# Patient Record
Sex: MALE | Race: WHITE | Employment: FULL TIME | URBAN - METROPOLITAN AREA
[De-identification: names, ages, dates, MRNs, and addresses within clinical notes are randomized per-mention and may not be internally consistent; named-entity substitution may affect disease eponyms.]

---

## 2019-02-14 ENCOUNTER — HOSPITAL ENCOUNTER (EMERGENCY)
Facility: CLINIC | Age: 49
Discharge: HOME | End: 2019-02-14
Attending: EMERGENCY MEDICINE | Admitting: EMERGENCY MEDICINE
Payer: COMMERCIAL

## 2019-02-14 ENCOUNTER — TRANSFERRED RECORDS (OUTPATIENT)
Dept: HEALTH INFORMATION MANAGEMENT | Facility: CLINIC | Age: 49
End: 2019-02-14

## 2019-02-14 ENCOUNTER — APPOINTMENT (OUTPATIENT)
Dept: CT IMAGING | Facility: CLINIC | Age: 49
End: 2019-02-14
Attending: EMERGENCY MEDICINE
Payer: COMMERCIAL

## 2019-02-14 VITALS
SYSTOLIC BLOOD PRESSURE: 128 MMHG | WEIGHT: 190 LBS | HEART RATE: 61 BPM | OXYGEN SATURATION: 94 % | DIASTOLIC BLOOD PRESSURE: 88 MMHG | RESPIRATION RATE: 9 BRPM

## 2019-02-14 DIAGNOSIS — R07.89 ATYPICAL CHEST PAIN: ICD-10-CM

## 2019-02-14 LAB
ANION GAP SERPL CALCULATED.3IONS-SCNC: 6 MMOL/L (ref 3–14)
BASOPHILS # BLD AUTO: 0 10E9/L (ref 0–0.2)
BASOPHILS NFR BLD AUTO: 0.2 %
BUN SERPL-MCNC: 15 MG/DL (ref 7–30)
CALCIUM SERPL-MCNC: 8.9 MG/DL (ref 8.5–10.1)
CHLORIDE SERPL-SCNC: 105 MMOL/L (ref 94–109)
CO2 SERPL-SCNC: 29 MMOL/L (ref 20–32)
CREAT SERPL-MCNC: 1.28 MG/DL (ref 0.66–1.25)
DIFFERENTIAL METHOD BLD: NORMAL
EOSINOPHIL # BLD AUTO: 0.2 10E9/L (ref 0–0.7)
EOSINOPHIL NFR BLD AUTO: 4.5 %
ERYTHROCYTE [DISTWIDTH] IN BLOOD BY AUTOMATED COUNT: 12 % (ref 10–15)
GFR SERPL CREATININE-BSD FRML MDRD: 65 ML/MIN/{1.73_M2}
GLUCOSE SERPL-MCNC: 86 MG/DL (ref 70–99)
HCT VFR BLD AUTO: 47.1 % (ref 40–53)
HGB BLD-MCNC: 16.4 G/DL (ref 13.3–17.7)
IMM GRANULOCYTES # BLD: 0 10E9/L (ref 0–0.4)
IMM GRANULOCYTES NFR BLD: 0.5 %
LYMPHOCYTES # BLD AUTO: 1.6 10E9/L (ref 0.8–5.3)
LYMPHOCYTES NFR BLD AUTO: 35.4 %
MCH RBC QN AUTO: 31 PG (ref 26.5–33)
MCHC RBC AUTO-ENTMCNC: 34.8 G/DL (ref 31.5–36.5)
MCV RBC AUTO: 89 FL (ref 78–100)
MONOCYTES # BLD AUTO: 0.4 10E9/L (ref 0–1.3)
MONOCYTES NFR BLD AUTO: 9.5 %
NEUTROPHILS # BLD AUTO: 2.2 10E9/L (ref 1.6–8.3)
NEUTROPHILS NFR BLD AUTO: 49.9 %
NRBC # BLD AUTO: 0 10*3/UL
NRBC BLD AUTO-RTO: 0 /100
PLATELET # BLD AUTO: 215 10E9/L (ref 150–450)
POTASSIUM SERPL-SCNC: 3.8 MMOL/L (ref 3.4–5.3)
RBC # BLD AUTO: 5.29 10E12/L (ref 4.4–5.9)
SODIUM SERPL-SCNC: 140 MMOL/L (ref 133–144)
TROPONIN I BLD-MCNC: 0 UG/L (ref 0–0.08)
TROPONIN I SERPL-MCNC: <0.015 UG/L (ref 0–0.04)
WBC # BLD AUTO: 4.4 10E9/L (ref 4–11)

## 2019-02-14 PROCEDURE — 93005 ELECTROCARDIOGRAM TRACING: CPT

## 2019-02-14 PROCEDURE — 25000128 H RX IP 250 OP 636: Performed by: EMERGENCY MEDICINE

## 2019-02-14 PROCEDURE — 84484 ASSAY OF TROPONIN QUANT: CPT | Performed by: EMERGENCY MEDICINE

## 2019-02-14 PROCEDURE — 99285 EMERGENCY DEPT VISIT HI MDM: CPT | Mod: 25

## 2019-02-14 PROCEDURE — 85025 COMPLETE CBC W/AUTO DIFF WBC: CPT | Performed by: EMERGENCY MEDICINE

## 2019-02-14 PROCEDURE — 80048 BASIC METABOLIC PNL TOTAL CA: CPT | Performed by: EMERGENCY MEDICINE

## 2019-02-14 PROCEDURE — 71260 CT THORAX DX C+: CPT

## 2019-02-14 PROCEDURE — 84484 ASSAY OF TROPONIN QUANT: CPT

## 2019-02-14 PROCEDURE — 74177 CT ABD & PELVIS W/CONTRAST: CPT

## 2019-02-14 RX ORDER — OMEGA-3 FATTY ACIDS/FISH OIL 300-1000MG
200 CAPSULE ORAL EVERY 4 HOURS PRN
COMMUNITY

## 2019-02-14 RX ORDER — ASPIRIN 81 MG/1
324 TABLET, CHEWABLE ORAL ONCE
Status: DISCONTINUED | OUTPATIENT
Start: 2019-02-14 | End: 2019-02-14

## 2019-02-14 RX ORDER — IOPAMIDOL 755 MG/ML
500 INJECTION, SOLUTION INTRAVASCULAR ONCE
Status: COMPLETED | OUTPATIENT
Start: 2019-02-14 | End: 2019-02-14

## 2019-02-14 RX ADMIN — SODIUM CHLORIDE 60 ML: 9 INJECTION, SOLUTION INTRAVENOUS at 16:48

## 2019-02-14 RX ADMIN — IOPAMIDOL 75 ML: 755 INJECTION, SOLUTION INTRAVENOUS at 16:48

## 2019-02-14 NOTE — ED TRIAGE NOTES
A&O x4, ABCDs intact. Pt complaint of midsternal chest pressure, back pain, and some numbness in the left shoulder and arm. Pt states he is also SOB. Pt is also having sporatic facial flushing.

## 2019-02-14 NOTE — ED PROVIDER NOTES
History     Chief Complaint:  Chest Pain and Numbness      HPI   Román Lee is a 48 year old male who presents with chest pain and numbness. The patient states that he has had intermittent episodes of non-exertional left sided chest pressure, back pain, and left shoulder numbness for the last 2.5 weeks. The patient states that he has had approximately 6 episodes and notes that they last up to several hours at a time. During the episodes, he also notes feeling generally weak with facial flushing. He states that he went to the chiropractor who suggested he be seen by a physician. The patient was then seen in clinic today and sent here to the ED for further evaluation.  Currently he reports his chest discomfort as a 2/10. The patient denies any leg pain or swelling.  Please refer to relevant cardiac / VTE risk factors as outlined below.  Pt notes his pain is not associated with exertion.  He ran 4 miles yesterday, and had no symptoms.  He acknowledges that his pain is sometimes associated with positions he is sitting in, such as on the cough, and questions if this pain could be coming from his spine.    Cardiac Risk Factors   Sex: Male   Tobacco: Negative  Hypertension: Negative  Diabetes: Negative  Hyperlipidemia: Negative  Family History: Negative    PE/DVT Risk Factors   Personal History: Negative  Recent Travel: Negative   Recent Surgery/Hospitalization: Negative  Tobacco: Negative  Family History: Negative    Allergies:  No known drug allergies.     Medications:    The patient is currently on no regular medications.      Past Medical History:    History reviewed.  No significant past medical history.      Past Surgical History:    History reviewed. No pertinent past surgical history.     Family History:    History reviewed. No pertinent family history.     Social History:  Marital Status: Single  Presents to the ED alone  Tobacco Use: Negative  Alcohol Use: Yes     Review of Systems   Constitutional:         Positive for generalized weakness.    Cardiovascular: Positive for chest pain.   All other systems reviewed and are negative.      Physical Exam   First Vitals:  BP: (!) 146/112  Pulse: 68  Heart Rate: 68  Resp: 18  Weight: 86.2 kg (190 lb)  SpO2: 100 %      Physical Exam  General:              Well-nourished              Speaking in full sentences  Eyes:              Conjunctiva without injection or scleral icterus  ENT:              Moist mucous membranes              Nares patent              Pinnae normal  Neck:              Full ROM              No stiffness appreciated  Resp:              Lungs CTAB              No crackles, wheezing or audible rubs              Good air movement  CV:                    Normal rate, regular rhythm              S1 and S2 present              No murmur, gallop or rub              No anterior chest wall tenderness              2+ radial pulse  GI:              BS present              Abdomen soft without distention              Non-tender to light and deep palpation              No guarding or rebound tenderness  Skin:              Warm, dry, well perfused              No rashes or open wounds on exposed skin  MSK:              Moves all extremities              No focal deformities or swelling              No left trapezius tenderness  Neuro:              Alert              Answers questions appropriately              Moves all extremities equally              Gait stable  Psych:              Normal affect, normal mood     PERC Rule for risk stratifying PE to low risk (calculator)  Background  Risk stratifies patients to low risk of PE if all 8 criteria are present including age <50, heart rate <100, O2 Sat >94%, no unilateral leg edema, no hemoptysis, no recent surgery or trauma, no prior VTE, and no hormone use.  Data  48 year old  Pulse: 68  SpO2: 100 %  Criteria              Of  8 possible items (all criteria must be present):  Age <50 years  Heart rate <100 bpm  Oxygen  Saturation >94%  No unilateral leg swelling  No hemoptysis  No surgery or trauma within 4 weeks  No prior DVT or PE  No hormone use (oral, transdermal and intravaginal estrogens)  Interpretation  All eight criteria are met AND low clinical PE suspicion: No further evaluation for PE required      HEART Score  Background  Calculates the overall risk of adverse event in patient's presenting with chest pain.  Based on 5 criteria (each assigned 0-2 points) including suspiciousness of history, EKG, age, risk factors and troponin.    Data  48 year old male  does not have a problem list on file.   reports that  has never smoked. He uses smokeless tobacco.  family history is not on file.  Lab Results   Component Value Date    TROPI <0.015 02/14/2019     Criteria   0-2 points for each of 5 items (maximum of 10 points):  Score 0- History slightly suspicious for coronary syndrome  Score 0- EKG Normal  Score 1- Age 45 to 65 years old  Score 0- No risk factors for atherosclerotic disease  Score 0- Within normal limits for troponin levels  Interpretation  Risk of adverse outcome  Heart Score: 1  Total Score 0-3- Adverse Outcome Risk 2.5% - Supports early discharge with appropriate follow-up          Emergency Department Course   ECG:  @ 1558  Indication: Chest Pain  Vent. Rate 67 bpm. LA interval 156 ms. QRS duration 88 ms. QT/QTc 398/420 ms. P-R-T axis 61 -6 20.   Normal sinus rhythm. Normal ECG.    Read @ 1604 by Dr. Rossi.     Imaging:  CT Aortic survey with contrast:   1. No evidence of aortic dissection.  2. Fatty liver.  Preliminary radiology read.     Radiographic findings were communicated with the patient who voiced understanding of the findings.    Laboratory:  (1606) Troponin I: <0.015  BMP: Creatinine 1.28 (H), otherwise WNL   CBC:  WBC 4.4, HGB 16.4,   (1802) Troponin POCT: 0.00     Emergency Department Course:  Nursing notes and vitals reviewed.  (1602) I performed an exam of the patient as documented above.     EKG was done, interpretation as above.   A peripheral IV was established. Blood was drawn from the patient. This was sent for laboratory testing, findings above.    The patient was sent for an aortic survey CT while in the emergency department, findings above.     (1620) I consulted with Dr. Brock of urgent care regarding the patient.   (1626) I updated the patient on results.  (1823) I updated the patient on results.   Findings and plan explained to the patient. Patient discharged home with instructions regarding supportive care, medications, and reasons to return. The importance of close follow-up was reviewed.    I personally reviewed the laboratory results with the patient and answered all related questions prior to discharge.         Impression & Plan    Medical Decision Making:  Román Lee is a 48-year-old male presenting to the emergency department from urgent care for evaluation of chest pain.  VS on presentation reveal elevated blood pressure, which improved during patient's emergency department course.  A broad differential was considered regarding the patient's presenting symptoms including though not limited to, ACS, PE, pneumothorax, pneumonia, aortic dissection, neuroendocrine process, cervical radiculopathy, anemia, among others.  Workup included advanced imaging, EKG, and laboratory studies.  Presently, the precise etiology for patient's chest discomfort remains unclear.  EKG obtained at the time of arrival demonstrates sinus rhythm without findings of acute ischemia.  No gross changes are noted compared to previous EKG from urgent care.  His initial and repeat troponin levels are both undetectable.  He does not endorse cardiac risk factors including hypertension, hyperlipidemia, diabetes, chronic kidney disease, tobacco use, nor premature family history of cardiac disease.  Patient does have a good exercise capacity, noting he ran 4 miles yesterday, without any exacerbation of his symptoms.   He does acknowledge a slight positional component as he feels symptoms are exacerbated when he is sitting in different positions on the couch.  I do acknowledge that his symptoms today began around 2 PM, and troponin values are obtained less than 6 hours since the onset of symptoms.  I have offered and suggested waiting until 8 PM for a repeat troponin test, though the patient at this time is eager for discharge home.  He has the capacity to make this decision. Pulmonary embolism unlikely as patient is low risk by Wells criteria and PERC negative.  CT chest abdomen pelvis demonstrates no evidence of acute aortic abnormality or other acute process.  We discussed the incidental findings of fatty liver and suggested dietary modifications including alcohol reduction. Referred cervical radiculopathy on the differential, though no gross motor or sensory deficits noted to suggest acute cord compressive process, and intermittent nature of symptoms is atypical. In discussion with urgent care, there was also concern for possible pheochromocytoma, given the paroxysmal nature of patient's symptoms.  There is no gross mass noted on the adrenal glands of his CT scan.  I have suggested follow-up with his primary care provider in 2 days for reevaluation as he may be a candidate for further testing with laboratory studies and 24-hour urine metanephrines.  He is agreeable towards this.  He is encouraged to continue monitoring his symptoms closely.  He is welcome to return to the ER in the meantime with any new or troubling symptoms such as recurrent pain, shortness of breath or any other concerns.  Given low pre-test probability for ACS, will defer to PCP on need for provocative study.  All of his questions were answered prior to discharge.      Diagnosis:    ICD-10-CM    1. Atypical chest pain R07.89        Disposition:  discharged to home          Rajni PETERSEN, am serving as a scribe on 2/14/2019 at 4:02 PM to personally  document services performed by Dr. Rossi based on my observations and the provider's statements to me.    2/14/2019   [facility] EMERGENCY DEPARTMENT       Mateus Rossi MD  02/14/19 [id number]

## 2019-02-14 NOTE — ED AVS SNAPSHOT
[facility] Emergency Department  [address]  [facility] [address]  Phone:  [phone]  Fax:  [phone]                                    Román Lee   MRN: [id number]    Department:  [facility] Emergency Department   Date of Visit:  2/14/2019           After Visit Summary Signature Page    I have received my discharge instructions, and my questions have been answered. I have discussed any challenges I see with this plan with the nurse or doctor.    ..........................................................................................................................................  Patient/Patient Representative Signature      ..........................................................................................................................................  Patient Representative Print Name and Relationship to Patient    ..................................................               ................................................  Date                                   Time    ..........................................................................................................................................  Reviewed by Signature/Title    ...................................................              ..............................................  Date                                               Time          22EPIC Rev 08/18

## 2019-02-15 LAB — INTERPRETATION ECG - MUSE: NORMAL

## 2019-02-15 NOTE — DISCHARGE INSTRUCTIONS
Please follow-up with your primary care provider in 2 days for re-evaluation.  As we discussed, there are some additional specialized tests which may be obtained to look into this further.  This is best done with your primary care provider.    Return to the ER in the meantime with any other new or troubling symptoms.    Discharge Instructions  Chest Pain    You have been seen today for chest pain or discomfort.  At this time, your provider has found no signs that your chest pain is due to a serious or life-threatening condition, (or you have declined more testing and/or admission to the hospital). However, sometimes there is a serious problem that does not show up right away. Your evaluation today may not be complete and you may need further testing and evaluation.     Generally, every Emergency Department visit should have a follow-up clinic visit with either a primary or a specialty clinic/provider. Please follow-up as instructed by your emergency provider today.  Return to the Emergency Department if:  Your chest pain changes, gets worse, starts to happen more often, or comes with less activity.  You are newly short of breath.  You get very weak or tired.  You pass out or faint.  You have any new symptoms, like fever, cough, numb legs, or you cough up blood.  You have anything else that worries you.    Until you follow-up with your regular provider, please do the following:  Take one aspirin daily unless you have an allergy or are told not to by your provider.  If a stress test appointment has been made, go to the appointment.  If you have questions, contact your regular provider.  Follow-up with your regular provider/clinic as directed; this is very important.    If you were given a prescription for medicine here today, be sure to read all of the information (including the package insert) that comes with your prescription.  This will include important information about the medicine, its side effects, and any  warnings that you need to know about.  The pharmacist who fills the prescription can provide more information and answer questions you may have about the medicine.  If you have questions or concerns that the pharmacist cannot address, please call or return to the Emergency Department.       Remember that you can always come back to the Emergency Department if you are not able to see your regular provider in the amount of time listed above, if you get any new symptoms, or if there is anything that worries you.

## 2019-02-15 NOTE — ED NOTES
Discharge Education provided to patient including pain control, and when to return to ED and PCP. Pt verbalized understanding. All questions answered. Pt discharged to home with self. Vital signs stable.